# Patient Record
Sex: MALE | Race: WHITE | Employment: FULL TIME | URBAN - METROPOLITAN AREA
[De-identification: names, ages, dates, MRNs, and addresses within clinical notes are randomized per-mention and may not be internally consistent; named-entity substitution may affect disease eponyms.]

---

## 2021-12-27 ENCOUNTER — APPOINTMENT (OUTPATIENT)
Dept: GENERAL RADIOLOGY | Age: 51
DRG: 177 | End: 2021-12-27
Attending: EMERGENCY MEDICINE
Payer: COMMERCIAL

## 2021-12-27 ENCOUNTER — APPOINTMENT (OUTPATIENT)
Dept: CT IMAGING | Age: 51
DRG: 177 | End: 2021-12-27
Attending: EMERGENCY MEDICINE
Payer: COMMERCIAL

## 2021-12-27 ENCOUNTER — HOSPITAL ENCOUNTER (INPATIENT)
Age: 51
LOS: 3 days | Discharge: HOME | DRG: 177 | End: 2021-12-30
Attending: EMERGENCY MEDICINE | Admitting: INTERNAL MEDICINE
Payer: COMMERCIAL

## 2021-12-27 DIAGNOSIS — R09.02 HYPOXIA: ICD-10-CM

## 2021-12-27 DIAGNOSIS — U07.1 COVID-19: Primary | ICD-10-CM

## 2021-12-27 PROBLEM — I42.9 CARDIOMYOPATHY (HCC): Status: ACTIVE | Noted: 2021-12-27

## 2021-12-27 PROBLEM — J12.82 PNEUMONIA DUE TO COVID-19 VIRUS: Status: ACTIVE | Noted: 2021-12-27

## 2021-12-27 PROBLEM — Z72.0 TOBACCO ABUSE: Status: ACTIVE | Noted: 2021-12-27

## 2021-12-27 PROBLEM — I10 HTN (HYPERTENSION), BENIGN: Status: ACTIVE | Noted: 2021-12-27

## 2021-12-27 LAB
ALBUMIN SERPL-MCNC: 3.2 G/DL (ref 3.5–5)
ALBUMIN/GLOB SERPL: 0.8 {RATIO} (ref 1.1–2.2)
ALP SERPL-CCNC: 36 U/L (ref 45–117)
ALT SERPL-CCNC: 40 U/L (ref 12–78)
ANION GAP SERPL CALC-SCNC: 9 MMOL/L (ref 5–15)
AST SERPL-CCNC: 40 U/L (ref 15–37)
ATRIAL RATE: 117 BPM
BASOPHILS # BLD: 0 K/UL (ref 0–0.1)
BASOPHILS NFR BLD: 0 % (ref 0–1)
BILIRUB SERPL-MCNC: 0.2 MG/DL (ref 0.2–1)
BUN SERPL-MCNC: 13 MG/DL (ref 6–20)
BUN/CREAT SERPL: 13 (ref 12–20)
CALCIUM SERPL-MCNC: 8.8 MG/DL (ref 8.5–10.1)
CALCULATED P AXIS, ECG09: 40 DEGREES
CALCULATED R AXIS, ECG10: -38 DEGREES
CALCULATED T AXIS, ECG11: 23 DEGREES
CHLORIDE SERPL-SCNC: 99 MMOL/L (ref 97–108)
CO2 SERPL-SCNC: 29 MMOL/L (ref 21–32)
COVID-19 RAPID TEST, COVR: DETECTED
CREAT SERPL-MCNC: 0.99 MG/DL (ref 0.7–1.3)
CRP SERPL-MCNC: 1.92 MG/DL (ref 0–0.6)
D DIMER PPP FEU-MCNC: 1.12 MG/L FEU (ref 0–0.65)
DIAGNOSIS, 93000: NORMAL
DIFFERENTIAL METHOD BLD: ABNORMAL
EOSINOPHIL # BLD: 0 K/UL (ref 0–0.4)
EOSINOPHIL NFR BLD: 0 % (ref 0–7)
ERYTHROCYTE [DISTWIDTH] IN BLOOD BY AUTOMATED COUNT: 13 % (ref 11.5–14.5)
GLOBULIN SER CALC-MCNC: 3.8 G/DL (ref 2–4)
GLUCOSE SERPL-MCNC: 115 MG/DL (ref 65–100)
HCT VFR BLD AUTO: 52 % (ref 36.6–50.3)
HGB BLD-MCNC: 17.3 G/DL (ref 12.1–17)
IMM GRANULOCYTES # BLD AUTO: 0 K/UL
IMM GRANULOCYTES NFR BLD AUTO: 0 %
LYMPHOCYTES # BLD: 0.7 K/UL (ref 0.8–3.5)
LYMPHOCYTES NFR BLD: 15 % (ref 12–49)
MCH RBC QN AUTO: 29.8 PG (ref 26–34)
MCHC RBC AUTO-ENTMCNC: 33.3 G/DL (ref 30–36.5)
MCV RBC AUTO: 89.5 FL (ref 80–99)
MONOCYTES # BLD: 0.2 K/UL (ref 0–1)
MONOCYTES NFR BLD: 5 % (ref 5–13)
NEUTS BAND NFR BLD MANUAL: 8 % (ref 0–6)
NEUTS SEG # BLD: 3.3 K/UL (ref 1.8–8)
NEUTS SEG NFR BLD: 66 % (ref 32–75)
NRBC # BLD: 0 K/UL (ref 0–0.01)
NRBC BLD-RTO: 0 PER 100 WBC
OTHER CELLS NFR BLD MANUAL: 6 %
P-R INTERVAL, ECG05: 182 MS
PLATELET # BLD AUTO: 181 K/UL (ref 150–400)
PMV BLD AUTO: 10.9 FL (ref 8.9–12.9)
POTASSIUM SERPL-SCNC: 3.7 MMOL/L (ref 3.5–5.1)
PROCALCITONIN SERPL-MCNC: <0.05 NG/ML
PROT SERPL-MCNC: 7 G/DL (ref 6.4–8.2)
Q-T INTERVAL, ECG07: 326 MS
QRS DURATION, ECG06: 90 MS
QTC CALCULATION (BEZET), ECG08: 454 MS
RBC # BLD AUTO: 5.81 M/UL (ref 4.1–5.7)
RBC MORPH BLD: ABNORMAL
SODIUM SERPL-SCNC: 137 MMOL/L (ref 136–145)
SOURCE, COVRS: ABNORMAL
TROPONIN-HIGH SENSITIVITY: 11 NG/L (ref 0–76)
VENTRICULAR RATE, ECG03: 117 BPM
WBC # BLD AUTO: 4.5 K/UL (ref 4.1–11.1)

## 2021-12-27 PROCEDURE — 96374 THER/PROPH/DIAG INJ IV PUSH: CPT

## 2021-12-27 PROCEDURE — 94640 AIRWAY INHALATION TREATMENT: CPT

## 2021-12-27 PROCEDURE — 71045 X-RAY EXAM CHEST 1 VIEW: CPT

## 2021-12-27 PROCEDURE — 74011250636 HC RX REV CODE- 250/636: Performed by: EMERGENCY MEDICINE

## 2021-12-27 PROCEDURE — 74011000636 HC RX REV CODE- 636: Performed by: EMERGENCY MEDICINE

## 2021-12-27 PROCEDURE — 85379 FIBRIN DEGRADATION QUANT: CPT

## 2021-12-27 PROCEDURE — 85025 COMPLETE CBC W/AUTO DIFF WBC: CPT

## 2021-12-27 PROCEDURE — 71275 CT ANGIOGRAPHY CHEST: CPT

## 2021-12-27 PROCEDURE — 93005 ELECTROCARDIOGRAM TRACING: CPT

## 2021-12-27 PROCEDURE — 36415 COLL VENOUS BLD VENIPUNCTURE: CPT

## 2021-12-27 PROCEDURE — 84484 ASSAY OF TROPONIN QUANT: CPT

## 2021-12-27 PROCEDURE — 84145 PROCALCITONIN (PCT): CPT

## 2021-12-27 PROCEDURE — 86140 C-REACTIVE PROTEIN: CPT

## 2021-12-27 PROCEDURE — 96361 HYDRATE IV INFUSION ADD-ON: CPT

## 2021-12-27 PROCEDURE — 99285 EMERGENCY DEPT VISIT HI MDM: CPT

## 2021-12-27 PROCEDURE — 80053 COMPREHEN METABOLIC PANEL: CPT

## 2021-12-27 PROCEDURE — 65270000029 HC RM PRIVATE

## 2021-12-27 PROCEDURE — 74011250637 HC RX REV CODE- 250/637: Performed by: EMERGENCY MEDICINE

## 2021-12-27 PROCEDURE — 87635 SARS-COV-2 COVID-19 AMP PRB: CPT

## 2021-12-27 RX ORDER — DEXAMETHASONE 6 MG/1
6 TABLET ORAL DAILY
Status: DISCONTINUED | OUTPATIENT
Start: 2021-12-28 | End: 2021-12-30 | Stop reason: HOSPADM

## 2021-12-27 RX ORDER — ACETAMINOPHEN 500 MG
1000 TABLET ORAL
Status: COMPLETED | OUTPATIENT
Start: 2021-12-27 | End: 2021-12-27

## 2021-12-27 RX ORDER — DEXAMETHASONE SODIUM PHOSPHATE 100 MG/10ML
6 INJECTION INTRAMUSCULAR; INTRAVENOUS ONCE
Status: COMPLETED | OUTPATIENT
Start: 2021-12-27 | End: 2021-12-27

## 2021-12-27 RX ORDER — ALBUTEROL SULFATE 90 UG/1
4 AEROSOL, METERED RESPIRATORY (INHALATION)
Status: COMPLETED | OUTPATIENT
Start: 2021-12-27 | End: 2021-12-27

## 2021-12-27 RX ADMIN — DEXAMETHASONE SODIUM PHOSPHATE 6 MG: 10 INJECTION INTRAMUSCULAR; INTRAVENOUS at 16:06

## 2021-12-27 RX ADMIN — IOPAMIDOL 100 ML: 755 INJECTION, SOLUTION INTRAVENOUS at 16:04

## 2021-12-27 RX ADMIN — SODIUM CHLORIDE 1000 ML: 9 INJECTION, SOLUTION INTRAVENOUS at 14:21

## 2021-12-27 RX ADMIN — ALBUTEROL SULFATE 4 PUFF: 90 AEROSOL, METERED RESPIRATORY (INHALATION) at 14:49

## 2021-12-27 RX ADMIN — ALBUTEROL SULFATE 4 PUFF: 90 AEROSOL, METERED RESPIRATORY (INHALATION) at 14:30

## 2021-12-27 RX ADMIN — ALBUTEROL SULFATE 4 PUFF: 90 AEROSOL, METERED RESPIRATORY (INHALATION) at 15:25

## 2021-12-27 RX ADMIN — ACETAMINOPHEN 1000 MG: 500 TABLET ORAL at 14:20

## 2021-12-27 NOTE — ED PROVIDER NOTES
The history is provided by the patient. No  was used. Positive For Covid-19  Severity:  Severe  Onset quality:  Gradual  Timing:  Constant  Progression:  Worsening  Relieved by:  Nothing  Worsened by:  Nothing  Associated symptoms: congestion, cough, diarrhea, headaches, myalgias, sore throat and vomiting    Associated symptoms: no chest pain, no chills, no confusion, no dysuria, no ear pain, no nausea, no rash, no rhinorrhea and no somnolence         Past Medical History:   Diagnosis Date    Cardiomyopathy     Hypertension        Past Surgical History:   Procedure Laterality Date    HX HIP REPLACEMENT Left 2018         History reviewed. No pertinent family history. Social History     Socioeconomic History    Marital status:      Spouse name: Not on file    Number of children: Not on file    Years of education: Not on file    Highest education level: Not on file   Occupational History    Not on file   Tobacco Use    Smoking status: Current Every Day Smoker     Packs/day: 0.50    Smokeless tobacco: Current User   Substance and Sexual Activity    Alcohol use: Yes     Alcohol/week: 3.0 standard drinks     Types: 3 Cans of beer per week    Drug use: No    Sexual activity: Not on file   Other Topics Concern    Not on file   Social History Narrative    Not on file     Social Determinants of Health     Financial Resource Strain:     Difficulty of Paying Living Expenses: Not on file   Food Insecurity:     Worried About Running Out of Food in the Last Year: Not on file    [place] of Food in the Last Year: Not on file   Transportation Needs:     Lack of Transportation (Medical): Not on file    Lack of Transportation (Non-Medical):  Not on file   Physical Activity:     Days of Exercise per Week: Not on file    Minutes of Exercise per Session: Not on file   Stress:     Feeling of Stress : Not on file   Social Connections:     Frequency of Communication with Friends and Family: Not on file    Frequency of Social Gatherings with Friends and Family: Not on file    Attends [employer] Services: Not on file    Active Member of Clubs or Organizations: Not on file    Attends Club or Organization Meetings: Not on file    Marital Status: Not on file   Intimate Partner Violence:     Fear of Current or Ex-Partner: Not on file    Emotionally Abused: Not on file    Physically Abused: Not on file    Sexually Abused: Not on file   Housing Stability:     Unable to Pay for Housing in the Last Year: Not on file    Number of [place] in the Last Year: Not on file    Unstable Housing in the Last Year: Not on file         ALLERGIES: Benadryl [diphenhydramine hcl]    Review of Systems   Constitutional: Negative for activity change, chills and fever. HENT: Positive for congestion and sore throat. Negative for ear pain, nosebleeds, rhinorrhea, trouble swallowing and voice change. Eyes: Negative for visual disturbance. Respiratory: Positive for cough and shortness of breath. Cardiovascular: Negative for chest pain and palpitations. Gastrointestinal: Positive for diarrhea and vomiting. Negative for abdominal pain, constipation and nausea. Genitourinary: Negative for difficulty urinating, dysuria, hematuria and urgency. Musculoskeletal: Positive for myalgias. Negative for back pain, neck pain and neck stiffness. Skin: Negative for color change and rash. Allergic/Immunologic: Negative for immunocompromised state. Neurological: Positive for headaches. Negative for dizziness, seizures, syncope, weakness, light-headedness and numbness. Psychiatric/Behavioral: Negative for behavioral problems, confusion, hallucinations, self-injury and suicidal ideas.        Vitals:    12/27/21 1314   BP: (!) 125/107   Pulse: (!) 129   Resp: 22   Temp: (!) 101 °F (38.3 °C)   SpO2: 90%   Weight: 121.5 kg (267 lb 13.7 oz)   Height: 6' 1\" (1.854 m)            Physical Exam  Vitals and nursing note reviewed. Constitutional:       General: He is not in acute distress. Appearance: He is well-developed. He is ill-appearing. He is not diaphoretic. HENT:      Head: Normocephalic and atraumatic. Eyes:      Pupils: Pupils are equal, round, and reactive to light. Cardiovascular:      Rate and Rhythm: Regular rhythm. Tachycardia present. Heart sounds: Normal heart sounds. No murmur heard. No friction rub. No gallop. Pulmonary:      Effort: Pulmonary effort is normal. No respiratory distress. Breath sounds: Wheezing present. Abdominal:      General: Bowel sounds are normal. There is no distension. Palpations: Abdomen is soft. Tenderness: There is no abdominal tenderness. There is no guarding or rebound. Musculoskeletal:         General: Normal range of motion. Cervical back: Normal range of motion and neck supple. Skin:     General: Skin is warm. Findings: No rash. Neurological:      Mental Status: He is alert and oriented to person, place, and time. Psychiatric:         Behavior: Behavior normal.         Thought Content: Thought content normal.         Judgment: Judgment normal.          MDM     This is a 65-year-old male with past medical history, review of systems, physical exam as above, presenting with complaints of cough, shortness of breath, myalgias, sore throat, nausea and vomiting. Patient states symptoms began 8 days ago, and he did a home test which was positive for coronavirus. Patient states he is not vaccinated. He endorses a history of cardiomyopathy, hyperlipidemia, endorses daily tobacco use of approximately 1 pack/day and weekly alcohol use. Physical exam is remarkable for ill-appearing middle-aged male, in no acute distress noted to be normotensive, febrile, tachycardic, satting in the low 90s at rest.  He has mild scattered wheezes, and decreased breath sounds to auscultation.   Chest x-ray obtained initially concerning for viral pneumonia. Plan to provide antipyretics and albuterol, IV fluids, obtain CMP, CBC, EKG, cardiac enzymes, D-dimer. We will reassess, and make a disposition. Procedures    SIGN OUT:  3:00 PM  Discussed pt's hx, disposition, and available diagnostic and imaging results with Dr. Courtney Fernandez. Reviewed care plans. Both providers and patient are in agreement with care plan. Dr. Elpidio Posey is transferring care of the pt to Dr. Courtney Fernandez at this time.

## 2021-12-27 NOTE — ED TRIAGE NOTES
Patient presents ambulatory to treatment area with a steady gait. Patient states he was diagnosed with covid 8 days PTA. Since that time, patient has had fevers and worsening shortness of breath. Patient has also had mild diarrhea and loss of appetite. Ambulatory spo2 90% on room air. Patient states symptoms are worse at night.

## 2021-12-27 NOTE — ED NOTES
Perfect Serve Consult for Admission  4:46 PM    ED Room Number: DXP01/77  Patient Name and age:  Bello Walsh 45 y.o.  male  Working Diagnosis:   1. COVID-19    2. Hypoxia        COVID-19 Suspicion:  yes  Sepsis present:  no  Reassessment needed: no  Code Status:  Full Code  Readmission: no  Isolation Requirements:  yes  Recommended Level of Care:  telemetry  Department:[place] [facility] - [phone]  Other: Patient is a 67-year-old male with past medical history significant for tobacco and alcohol use as well as history of familial cardiomyopathy and hyperlipidemia who presents with COVID-19 infection and hypoxia, viral pna on imaging.

## 2021-12-28 PROBLEM — J96.01 ACUTE RESPIRATORY FAILURE WITH HYPOXIA (HCC): Status: ACTIVE | Noted: 2021-12-28

## 2021-12-28 LAB
COMMENT, HOLDF: NORMAL
PROCALCITONIN SERPL-MCNC: <0.05 NG/ML
SAMPLES BEING HELD,HOLD: NORMAL

## 2021-12-28 PROCEDURE — XW0DXM6 INTRODUCTION OF BARICITINIB INTO MOUTH AND PHARYNX, EXTERNAL APPROACH, NEW TECHNOLOGY GROUP 6: ICD-10-PCS | Performed by: INTERNAL MEDICINE

## 2021-12-28 PROCEDURE — 94664 DEMO&/EVAL PT USE INHALER: CPT

## 2021-12-28 PROCEDURE — 74011250636 HC RX REV CODE- 250/636: Performed by: INTERNAL MEDICINE

## 2021-12-28 PROCEDURE — 2709999900 HC NON-CHARGEABLE SUPPLY

## 2021-12-28 PROCEDURE — 74011250637 HC RX REV CODE- 250/637: Performed by: INTERNAL MEDICINE

## 2021-12-28 PROCEDURE — 94760 N-INVAS EAR/PLS OXIMETRY 1: CPT

## 2021-12-28 PROCEDURE — 36415 COLL VENOUS BLD VENIPUNCTURE: CPT

## 2021-12-28 PROCEDURE — 84145 PROCALCITONIN (PCT): CPT

## 2021-12-28 PROCEDURE — 94640 AIRWAY INHALATION TREATMENT: CPT

## 2021-12-28 PROCEDURE — 77030027138 HC INCENT SPIROMETER -A

## 2021-12-28 PROCEDURE — 65270000029 HC RM PRIVATE

## 2021-12-28 RX ORDER — HYDROCODONE BITARTRATE AND ACETAMINOPHEN 5; 325 MG/1; MG/1
1 TABLET ORAL
Status: DISCONTINUED | OUTPATIENT
Start: 2021-12-28 | End: 2021-12-30 | Stop reason: HOSPADM

## 2021-12-28 RX ORDER — ATORVASTATIN CALCIUM 20 MG/1
20 TABLET, FILM COATED ORAL DAILY
Status: DISCONTINUED | OUTPATIENT
Start: 2021-12-28 | End: 2021-12-30 | Stop reason: HOSPADM

## 2021-12-28 RX ORDER — PANTOPRAZOLE SODIUM 40 MG/1
40 TABLET, DELAYED RELEASE ORAL
Status: DISCONTINUED | OUTPATIENT
Start: 2021-12-28 | End: 2021-12-28

## 2021-12-28 RX ORDER — ALBUTEROL SULFATE 90 UG/1
2 AEROSOL, METERED RESPIRATORY (INHALATION)
Status: DISCONTINUED | OUTPATIENT
Start: 2021-12-28 | End: 2021-12-30 | Stop reason: HOSPADM

## 2021-12-28 RX ORDER — PANTOPRAZOLE SODIUM 40 MG/1
40 TABLET, DELAYED RELEASE ORAL
Status: DISCONTINUED | OUTPATIENT
Start: 2021-12-28 | End: 2021-12-30 | Stop reason: HOSPADM

## 2021-12-28 RX ORDER — ASCORBIC ACID 500 MG
500 TABLET ORAL 2 TIMES DAILY
Status: DISCONTINUED | OUTPATIENT
Start: 2021-12-28 | End: 2021-12-30 | Stop reason: HOSPADM

## 2021-12-28 RX ORDER — ONDANSETRON 2 MG/ML
4 INJECTION INTRAMUSCULAR; INTRAVENOUS
Status: DISCONTINUED | OUTPATIENT
Start: 2021-12-28 | End: 2021-12-30 | Stop reason: HOSPADM

## 2021-12-28 RX ORDER — BENZONATATE 100 MG/1
100 CAPSULE ORAL 3 TIMES DAILY
Status: DISCONTINUED | OUTPATIENT
Start: 2021-12-28 | End: 2021-12-30 | Stop reason: HOSPADM

## 2021-12-28 RX ORDER — LISINOPRIL 5 MG/1
5 TABLET ORAL DAILY
Status: DISCONTINUED | OUTPATIENT
Start: 2021-12-28 | End: 2021-12-30 | Stop reason: HOSPADM

## 2021-12-28 RX ORDER — HYDROCODONE POLISTIREX AND CHLORPHENIRAMINE POLISTIREX 10; 8 MG/5ML; MG/5ML
5 SUSPENSION, EXTENDED RELEASE ORAL
Status: DISCONTINUED | OUTPATIENT
Start: 2021-12-28 | End: 2021-12-30 | Stop reason: HOSPADM

## 2021-12-28 RX ORDER — ACETAMINOPHEN 325 MG/1
650 TABLET ORAL
Status: DISCONTINUED | OUTPATIENT
Start: 2021-12-28 | End: 2021-12-30 | Stop reason: HOSPADM

## 2021-12-28 RX ORDER — SPIRONOLACTONE 25 MG/1
25 TABLET ORAL DAILY
Status: DISCONTINUED | OUTPATIENT
Start: 2021-12-28 | End: 2021-12-30 | Stop reason: HOSPADM

## 2021-12-28 RX ORDER — ENOXAPARIN SODIUM 100 MG/ML
40 INJECTION SUBCUTANEOUS EVERY 24 HOURS
Status: DISCONTINUED | OUTPATIENT
Start: 2021-12-28 | End: 2021-12-30 | Stop reason: HOSPADM

## 2021-12-28 RX ORDER — IBUPROFEN 200 MG
1 TABLET ORAL DAILY
Status: DISCONTINUED | OUTPATIENT
Start: 2021-12-28 | End: 2021-12-30 | Stop reason: HOSPADM

## 2021-12-28 RX ORDER — ZINC SULFATE 50(220)MG
1 CAPSULE ORAL DAILY
Status: DISCONTINUED | OUTPATIENT
Start: 2021-12-28 | End: 2021-12-30 | Stop reason: HOSPADM

## 2021-12-28 RX ORDER — SODIUM CHLORIDE 9 MG/ML
75 INJECTION, SOLUTION INTRAVENOUS CONTINUOUS
Status: DISCONTINUED | OUTPATIENT
Start: 2021-12-28 | End: 2021-12-28

## 2021-12-28 RX ORDER — METOPROLOL SUCCINATE 50 MG/1
100 TABLET, EXTENDED RELEASE ORAL DAILY
Status: DISCONTINUED | OUTPATIENT
Start: 2021-12-28 | End: 2021-12-30 | Stop reason: HOSPADM

## 2021-12-28 RX ORDER — ASPIRIN 81 MG/1
81 TABLET ORAL DAILY
Status: DISCONTINUED | OUTPATIENT
Start: 2021-12-28 | End: 2021-12-30 | Stop reason: HOSPADM

## 2021-12-28 RX ADMIN — ATORVASTATIN CALCIUM 20 MG: 20 TABLET, FILM COATED ORAL at 08:41

## 2021-12-28 RX ADMIN — PANTOPRAZOLE SODIUM 40 MG: 40 TABLET, DELAYED RELEASE ORAL at 08:41

## 2021-12-28 RX ADMIN — BARICITINIB 4 MG: 2 TABLET, FILM COATED ORAL at 13:09

## 2021-12-28 RX ADMIN — LISINOPRIL 5 MG: 5 TABLET ORAL at 08:40

## 2021-12-28 RX ADMIN — OXYCODONE HYDROCHLORIDE AND ACETAMINOPHEN 500 MG: 500 TABLET ORAL at 08:41

## 2021-12-28 RX ADMIN — OXYCODONE HYDROCHLORIDE AND ACETAMINOPHEN 500 MG: 500 TABLET ORAL at 18:02

## 2021-12-28 RX ADMIN — ZINC SULFATE 220 MG (50 MG) CAPSULE 1 CAPSULE: CAPSULE at 08:40

## 2021-12-28 RX ADMIN — SPIRONOLACTONE 25 MG: 25 TABLET ORAL at 08:40

## 2021-12-28 RX ADMIN — METOPROLOL SUCCINATE 100 MG: 50 TABLET, EXTENDED RELEASE ORAL at 08:41

## 2021-12-28 RX ADMIN — ENOXAPARIN SODIUM 40 MG: 100 INJECTION SUBCUTANEOUS at 14:17

## 2021-12-28 RX ADMIN — IPRATROPIUM BROMIDE AND ALBUTEROL 1 PUFF: 20; 100 SPRAY, METERED RESPIRATORY (INHALATION) at 20:02

## 2021-12-28 RX ADMIN — BENZONATATE 100 MG: 100 CAPSULE ORAL at 14:17

## 2021-12-28 RX ADMIN — ASPIRIN 81 MG: 81 TABLET, COATED ORAL at 08:40

## 2021-12-28 RX ADMIN — PANTOPRAZOLE SODIUM 40 MG: 40 TABLET, DELAYED RELEASE ORAL at 18:02

## 2021-12-28 RX ADMIN — BENZONATATE 100 MG: 100 CAPSULE ORAL at 22:53

## 2021-12-28 RX ADMIN — SODIUM CHLORIDE 75 ML/HR: 9 INJECTION, SOLUTION INTRAVENOUS at 05:03

## 2021-12-28 RX ADMIN — DEXAMETHASONE 6 MG: 6 TABLET ORAL at 08:40

## 2021-12-28 RX ADMIN — ACETAMINOPHEN 650 MG: 325 TABLET ORAL at 18:02

## 2021-12-28 NOTE — PROGRESS NOTES
Baricitinib Initiation Note    This patient meets criteria for initiation of baricitinib based on the following:  Confirmed COVID-19 (+) and hospitalized  Requiring oxygen support - specific O2 saturation is not a determinant for baricitinib  Elevated inflammatory markers (CRP, D-dimer, LDH, or ferritin). Concomitant therapy with dexamethasone 6-20 mg IV/PO daily (or equivalent steroid)  Do not give if patient has already received tocilizumab for COVID-19 treatment due to long half-life of tocilizumab (16 days)     Prescribers should weigh risks/benefits before initiating therapy in patients with the following:   Pregnancy  Severe hepatic impairment  Chronic/recurrent infections  Hemoglobin <8.0 g/dL   History/current thrombosis    Plan:  - Baricitinib 4 mg po daily X 14 days or until hospital discharge, whichever is sooner, has been ordered. Dose has been adjusted for renal function. Confirmed with RN over the phone patient is currently receiving 4 L NC O2. CRP is elevated at baseline but <7.5 mg/dL  - Dexamethasone 6 mg PO daily is ordered  - Renal function will be monitored daily while on baricitinib. -VTE prophylaxis is recommended unless contraindicated.  Will ask hospitalist if we can start lovenox 40 mg Q12 hrs

## 2021-12-28 NOTE — ACP (ADVANCE CARE PLANNING)
Advance Care Planning   Healthcare Decision Maker:       Primary Decision Maker: Julissa Carballo - Spouse - [phone]    Click here to complete [address] including selection of the Healthcare Decision Maker Relationship (ie \"Primary\")  Today we documented Decision Maker(s) consistent with Legal Next of Kin hierarchy. Updated contact and phone number for pt's wife.

## 2021-12-28 NOTE — PROGRESS NOTES
Srikanth Marquez [facility]  [address]  [phone]      Medical Progress Note      NAME: Nick Coto   :  1970  MRM:  [id number]    Date/Time: 2021  2:53 PM         Subjective:     Chief Complaint:  \"I'm okay\"     Pt seen and examined. Wife called to update while in the room. Pt feels slightly less SOB today. Poor PO intake but tolerating liquids     ROS:  (bold if positive, if negative)             Objective:       Vitals:          Last 24hrs VS reviewed since prior progress note. Most recent are:    Visit Vitals  /88 (BP 1 Location: Right upper arm, BP Patient Position: At rest)   Pulse 76   Temp 98.3 °F (36.8 °C)   Resp 22   Ht 6' 1\" (1.854 m)   Wt 121.5 kg (267 lb 13.7 oz)   SpO2 93%   BMI 35.34 kg/m²     SpO2 Readings from Last 6 Encounters:   21 93%   11 98%    O2 Flow Rate (L/min): 4 l/min       Intake/Output Summary (Last 24 hours) at 2021 1453  Last data filed at 2021 0830  Gross per 24 hour   Intake 1000 ml   Output 275 ml   Net 725 ml          Exam:     Physical Exam:    Gen:  Well-developed, well-nourished, in no acute distress  HEENT:  Pink conjunctivae, PERRL, hearing intact to voice, moist mucous membranes  Neck:  Supple, without masses, thyroid non-tender  Resp: Scattered crackles.  Poor air movement   Card:  No murmurs, normal S1, S2 without thrills, bruits or peripheral edema  Abd:  Soft, non-tender, non-distended, normoactive bowel sounds are present  Musc:  No cyanosis or clubbing  Skin:  No rashes or ulcers, skin turgor is good  Neuro:  Cranial nerves 3-12 are grossly intact,  strength is 5/5 bilaterally and dorsi / plantarflexion is 5/5 bilaterally, follows commands appropriately  Psych:  Good insight, oriented to person, place and time, alert    Medications Reviewed: (see below)    Lab Data Reviewed: (see below)    ______________________________________________________________________    Medications:     Current Facility-Administered Medications   Medication Dose Route Frequency    aspirin delayed-release tablet 81 mg  81 mg Oral DAILY    lisinopriL (PRINIVIL, ZESTRIL) tablet 5 mg  5 mg Oral DAILY    metoprolol succinate (TOPROL-XL) XL tablet 100 mg  100 mg Oral DAILY    atorvastatin (LIPITOR) tablet 20 mg  20 mg Oral DAILY    spironolactone (ALDACTONE) tablet 25 mg  25 mg Oral DAILY    nicotine (NICODERM CQ) 21 mg/24 hr patch 1 Patch  1 Patch TransDERmal DAILY    ascorbic acid (vitamin C) (VITAMIN C) tablet 500 mg  500 mg Oral BID    zinc sulfate (ZINCATE) 50 mg zinc (220 mg) capsule 1 Capsule  1 Capsule Oral DAILY    baricitinib (OLUMIANT) tablet 4 mg  4 mg Oral DAILY    benzonatate (TESSALON) capsule 100 mg  100 mg Oral TID    pantoprazole (PROTONIX) tablet 40 mg  40 mg Oral ACB&D    aluminum-magnesium hydroxide (MAALOX) oral suspension 15 mL  15 mL Oral QID PRN    aluminum-magnesium hydroxide (MAALOX) oral suspension 15 mL  15 mL Oral ONCE    HYDROcodone-chlorpheniramine (TUSSIONEX) oral suspension 5 mL  5 mL Oral Q12H PRN    acetaminophen (TYLENOL) tablet 650 mg  650 mg Oral Q6H PRN    HYDROcodone-acetaminophen (NORCO) 5-325 mg per tablet 1 Tablet  1 Tablet Oral Q6H PRN    ondansetron (ZOFRAN) injection 4 mg  4 mg IntraVENous Q6H PRN    enoxaparin (LOVENOX) injection 40 mg  40 mg SubCUTAneous Q24H    dexAMETHasone (DECADRON) tablet 6 mg  6 mg Oral DAILY            Lab Review:     Recent Labs     12/27/21  1338   WBC 4.5   HGB 17.3*   HCT 52.0*        Recent Labs     12/27/21  1338      K 3.7   CL 99   CO2 29   *   BUN 13   CREA 0.99   CA 8.8   ALB 3.2*   ALT 40     No components found for: [place]         Assessment / Plan:   Acute respiratory failure with hypoxia.  At risk for decompensation due to vaccination status, co-morbidities and weight   -start baricitinib  -procalcitonin low   -d-dimer low and CTA negative => DVT prophylaxis started   -vitamin C, zinc   -start Combivent; PRN albuterol   -continue steroids   -incentive spirometry, OOB, prone => pt aware   -trend      Pneumonia due to COVID-19 virus (12/27/2021).   Diagnosed about 8-9 days ago  -CRP is elevated; start baricitinib     HTN (hypertension), benign (12/27/2021)/ Cardiomyopathy ([address] (12/27/2021)  -on metoprolol, lisinopril     Tobacco abuse (12/27/2021)  -nicotine patch      Additional time spent from 1PM to 135 PM. Wife called and updated     Total time spent with patient: 28 [address] discussed with: Patient and Family    Discussed:  Care Plan    Prophylaxis:  Lovenox    Disposition:  Home w/Family           ___________________________________________________    Attending Physician: Ave Stearns MD

## 2021-12-28 NOTE — PROGRESS NOTES
Problem: Risk for Spread of Infection  Goal: Prevent transmission of infectious organism to others  Description: Prevent the transmission of infectious organisms to other patients, staff members, and visitors. Outcome: Progressing Towards Goal     Problem: Patient Education:  Go to Education Activity  Goal: Patient/Family Education  Outcome: Progressing Towards Goal     Problem: Falls - Risk of  Goal: *Absence of Falls  Description: Document Lidia Pedro Fall Risk and appropriate interventions in the flowsheet. Outcome: Progressing Towards Goal  Note: Fall Risk Interventions:            Medication Interventions: Teach patient to arise slowly                   Problem: Patient Education: Go to Patient Education Activity  Goal: Patient/Family Education  Outcome: Progressing Towards Goal     Problem: Airway Clearance - Ineffective  Goal: Achieve or maintain patent airway  Outcome: Progressing Towards Goal     Problem: Gas Exchange - Impaired  Goal: Absence of hypoxia  Outcome: Progressing Towards Goal  Goal: Promote optimal lung function  Outcome: Progressing Towards Goal     Problem: Breathing Pattern - Ineffective  Goal: Ability to achieve and maintain a regular respiratory rate  Outcome: Progressing Towards Goal     Problem:  Body Temperature -  Risk of, Imbalanced  Goal: Ability to maintain a body temperature within defined limits  Outcome: Progressing Towards Goal  Goal: Will regain or maintain usual level of consciousness  Outcome: Progressing Towards Goal  Goal: Complications related to the disease process, condition or treatment will be avoided or minimized  Outcome: Progressing Towards Goal     Problem: Isolation Precautions - Risk of Spread of Infection  Goal: Prevent transmission of infectious organism to others  Outcome: Progressing Towards Goal     Problem: Nutrition Deficits  Goal: Optimize nutrtional status  Outcome: Progressing Towards Goal     Problem: Risk for Fluid Volume Deficit  Goal: Maintain normal heart rhythm  Outcome: Progressing Towards Goal  Goal: Maintain absence of muscle cramping  Outcome: Progressing Towards Goal  Goal: Maintain normal serum potassium, sodium, calcium, phosphorus, and pH  Outcome: Progressing Towards Goal     Problem: Loneliness or Risk for Loneliness  Goal: Demonstrate positive use of time alone when socialization is not possible  Outcome: Progressing Towards Goal     Problem: Fatigue  Goal: Verbalize increase energy and improved vitality  Outcome: Progressing Towards Goal     Problem: Patient Education: Go to Patient Education Activity  Goal: Patient/Family Education  Outcome: Progressing Towards Goal

## 2021-12-28 NOTE — PROGRESS NOTES
12/28/2021 8:54 AM   Care Management Assessment      Reason for Admission:  Emergency -       ICD-10-CM ICD-9-CM    1. COVID-19  U07.1 079.89    2. Hypoxia  R09.02 799.02        Assessment:    [x]Via p/c with pt   Charted address and phone numbers confirmed. RUR: 5%  Risk Level: [x]Low []Moderate []High  Value-based purchasing: [] Yes [x] No  Bundle patient: [] Yes [x] No   Specify:     Advance Directive: No Order. [x] No AD on file. LNOK updated in chart. [] AD on file. [] Current AD not on file. Copy requested. [] Requests AD, and referral submitted to [facility]. Healthcare Decision Maker:   Primary Decision Maker: Greta Perez - Spouse - [phone]    Assessment:    Age: 45    Sex: [x] Male []Female     Residency: [x]Private residence     Lives With: [x]With spouse, Davian Etienne    Prior functioning:  [x]Independent with ADLs and iADLS []Dependent with ADLs and iADLs []Partial dependence, Specify:     Prior DME required:  [x]None []RW []Cane []Crutches []Bedside commode []CPAP []Home O2 (Liter/Provider: ) []Nebulizer   []Shower Chair []Wheelchair []Hospital Bed []Roel []Stair lift []Rollator []Other:    DME available: [x]None []RW []Cane []Crutches []Bedside commode []CPAP []Home O2 (Liter/Provider: ) []Nebulizer   []Shower Chair []Wheelchair []Hospital Bed []Roel []Stair lift []Rollator []Other:    Rehab history: []None [x]Outpatient PT []Home Health (Provider/Date: ) []SNF (Provider/Date: ) []IPR (Provider/Date: ) []LTC (Provider/Date: ) []Hospice (Provider/Date: )  []Other:     Discharge Concerns: []Yes [x]No []Unknown   Describe:    Comments:      Insurer:   Insurance Information                [employer] [address] Phone: --    Subscriber: Christina Giles Subscriber#: [id number]    Group#: [id number] Precert#: --          PCP: Shaista Zamora   Address: [facility] [address] [facility] [address] [place] [address]   Phone number: [phone]   Current patient: [x]Yes []No   Approximate date of last visit: over a year   Access to virtual PCP visits: [x]Yes []No    Pharmacy:  [facility], pt he will confirm this with his wife prior to discharge    DC Transport: Pt's wife       Transition of care plan:  [x] Home with outpatient follow-up  CM will follow for needs. ALEX Hunt  Care Management Interventions  PCP Verified by CM:  Yes Jaylynrenee Herr )  MyChart Signup: No  Discharge Durable Medical Equipment: No  Physical Therapy Consult: No  Occupational Therapy Consult: No  Speech Therapy Consult: No  Support Systems: Spouse/Significant Other  Discharge Location  Discharge Placement: Home with family assistance

## 2021-12-28 NOTE — ROUTINE PROCESS
2040 - TRANSFER - IN REPORT:    Verbal report received from Wilma Wheeler (name) on Gabriel Valderrama  being received from [facility] ED(unit) for routine progression of care      Report consisted of patients Situation, Background, Assessment and   Recommendations(SBAR). Information from the following report(s) SBAR, Intake/Output, MAR, Recent Results and Dual Neuro Assessment was reviewed with the receiving nurse. Opportunity for questions and clarification was provided. Assessment completed upon patients arrival to unit and care assumed. 3473 - Patient arrived via [facility].    0715 - Bedside and Verbal shift change report given to [employer] (oncoming nurse) by MICH Whitfield RN (offgoing nurse). Report included the following information SBAR, Intake/Output, MAR, Recent Results and Dual Neuro Assessment.

## 2021-12-28 NOTE — H&P
[address]  [facility]  [phone]    Admission History and Physical      NAME:  Humaira Lerma   :   1970   MRN:  [id number]     PCP:  Teresa Kasper MD     Date of service:  2021         Subjective:     CHIEF COMPLAINT: Cough, shortness of breath, fever    HISTORY OF PRESENT ILLNESS:     Mr. Alen Akers is a 45 y.o.  male who is admitted with acute respiratory failure with hypoxia. Mr. Alen Akers with past medical history of HTN, CM presented to ER complaining of cough, shortness of breath, fever which started about few days ago. He tested positive for Covid 19 about 8 days ago and his symptoms got progressively worse. The last 2 days shortness of breath cough and fever got worse. The cough is nonproductive. Patient is unvaccinated against Covid 19. Past Medical History:   Diagnosis Date    Cardiomyopathy     Hypertension         Past Surgical History:   Procedure Laterality Date    HX HIP REPLACEMENT Left        Social History     Tobacco Use    Smoking status: Current Every Day Smoker     Packs/day: 0.50    Smokeless tobacco: Current User   Substance Use Topics    Alcohol use: Yes     Alcohol/week: 3.0 standard drinks     Types: 3 Cans of beer per week        History reviewed. No pertinent family history. Allergies   Allergen Reactions    Benadryl [Diphenhydramine Hcl] Itching        Prior to Admission medications    Medication Sig Start Date End Date Taking? Authorizing Provider   simvastatin (ZOCOR) 40 mg tablet Take 40 mg by mouth nightly. Yes Other, MD Angelo   lisinopril (PRINIVIL, ZESTRIL) 5 mg tablet Take 5 mg by mouth daily. Yes Other, MD Angelo   spironolactone (ALDACTONE) 25 mg tablet Take 25 mg by mouth daily. Yes Other, MD Angelo   METOPROLOL SUCCINATE PO Take 100 mg by mouth daily.    Yes Alma, MD Angelo   lansoprazole (PREVACID) 30 mg capsule Take 30 mg by mouth Daily (before breakfast). Yes Other, MD Angelo   aspirin 81 mg tablet Take 81 mg by mouth. Yes Other, MD Angelo   chlorpheniramine-HYDROcodone (TUSSIONEX PENNKINETIC ER) 8-10 mg/5 mL suspension Take 5 mL by mouth three (3) times daily. Patient not taking: Reported on 12/27/2021 12/3/11   Briseyda Hearn MD   albuterol (PROVENTIL HFA, VENTOLIN HFA) 90 mcg/Actuation inhaler Take 2 Puffs by inhalation every four (4) hours as needed for Wheezing and Shortness of Breath (cough).   Patient not taking: Reported on 12/27/2021 12/3/11   Briseyda Hearn MD         Review of Systems:  (bold if positive, if negative)    Gen:  , fever,Eyes:  ENT:  CVS:  Pulm:  Cough, dyspneaGI:  :  MS:  Skin:  Psych:  Endo:  Hem:  Renal:  Neuro:            Objective:      VITALS:    Vital signs reviewed; most recent are:    Visit Vitals  /72   Pulse 81   Temp 98.4 °F (36.9 °C)   Resp 21   Ht 6' 1\" (1.854 m)   Wt 121.5 kg (267 lb 13.7 oz)   SpO2 92%   BMI 35.34 kg/m²     SpO2 Readings from Last 6 Encounters:   12/28/21 92%   12/03/11 98%            Intake/Output Summary (Last 24 hours) at 12/28/2021 0414  Last data filed at 12/27/2021 1527  Gross per 24 hour   Intake 1000 ml   Output    Net 1000 ml            Exam:     Physical Exam:    Gen:  Well-developed, well-nourished, in no acute distress  HEENT:  Pink conjunctivae, PERRL, hearing intact to voice, moist mucous membranes  Neck:  Supple, without masses, thyroid non-tender  Resp:  No accessory muscle use, clear breath sounds without wheezes rales or rhonchi  Card:  No murmurs, normal S1, S2 without thrills, bruits or peripheral edema  Abd:  Soft, non-tender, non-distended, normoactive bowel sounds are present, no palpable organomegaly and no detectable hernias  Lymph:  No cervical or inguinal adenopathy  Musc:  No cyanosis or clubbing  Skin:  No rashes or ulcers, skin turgor is good  Neuro:  Cranial nerves are grossly intact, no focal motor weakness, follows commands appropriately  Psych: Good insight, oriented to person, place and time, alert       Labs:    Recent Labs     12/27/21  1338   WBC 4.5   HGB 17.3*   HCT 52.0*        Recent Labs     12/27/21  1338      K 3.7   CL 99   CO2 29   *   BUN 13   CREA 0.99   CA 8.8   ALB 3.2*   TBILI 0.2   ALT 40     Lab Results   Component Value Date/Time    Glucose (POC) 106 12/03/2011 09:28 PM    Glucose (POC) 108 07/28/2009 11:20 PM     No results for input(s): PH, PCO2, PO2, HCO3, FIO2 in the last 72 hours. No results for input(s): INR, INREXT in the last 72 hours. Telemetry reviewed:   normal sinus rhythm       Assessment/Plan:    1. Acute respiratory failure with hypoxia, mild. Supplemental oxygen to keep SaO2 greater than 90%, incentive spirometer    2. Pneumonia due to COVID-19 virus (12/27/2021). Diagnosed about 8 days ago. The CRP is not elevated and does not require baricitinib. Starts dexamethasone, ascorbic acid, zinc.  Monitor inflammatory markers    3. HTN (hypertension), benign (12/27/2021)/ Cardiomyopathy ([address] (12/27/2021). Continue home metoprolol, lisinopril, simvastatin    4. Tobacco abuse (12/27/2021). Advised to quit.   Patient requesting nicotine patch       Previous medical records reviewed     Risk of deterioration: high      Total time spent with patient: [address] discussed with: Patient, Nursing Staff and >50% of time spent in counseling and coordination of care    Discussed:  Care Plan    Prophylaxis:  Lovenox    Probable Disposition:  Home w/Family           ___________________________________________________    Attending Physician: Hodan Fernandez MD

## 2021-12-29 LAB
ALBUMIN SERPL-MCNC: 2.9 G/DL (ref 3.5–5)
ALBUMIN/GLOB SERPL: 0.7 {RATIO} (ref 1.1–2.2)
ALP SERPL-CCNC: 35 U/L (ref 45–117)
ALT SERPL-CCNC: 36 U/L (ref 12–78)
ANION GAP SERPL CALC-SCNC: 7 MMOL/L (ref 5–15)
AST SERPL-CCNC: 32 U/L (ref 15–37)
BILIRUB SERPL-MCNC: 0.3 MG/DL (ref 0.2–1)
BUN SERPL-MCNC: 18 MG/DL (ref 6–20)
BUN/CREAT SERPL: 19 (ref 12–20)
CALCIUM SERPL-MCNC: 9.6 MG/DL (ref 8.5–10.1)
CHLORIDE SERPL-SCNC: 101 MMOL/L (ref 97–108)
CO2 SERPL-SCNC: 30 MMOL/L (ref 21–32)
COMMENT, HOLDF: NORMAL
CREAT SERPL-MCNC: 0.94 MG/DL (ref 0.7–1.3)
CRP SERPL-MCNC: 0.68 MG/DL (ref 0–0.6)
D DIMER PPP FEU-MCNC: 0.64 MG/L FEU (ref 0–0.65)
FERRITIN SERPL-MCNC: 485 NG/ML (ref 26–388)
GLOBULIN SER CALC-MCNC: 4 G/DL (ref 2–4)
GLUCOSE SERPL-MCNC: 126 MG/DL (ref 65–100)
LDH SERPL L TO P-CCNC: 278 U/L (ref 85–241)
POTASSIUM SERPL-SCNC: 4.7 MMOL/L (ref 3.5–5.1)
PROT SERPL-MCNC: 6.9 G/DL (ref 6.4–8.2)
SAMPLES BEING HELD,HOLD: NORMAL
SODIUM SERPL-SCNC: 138 MMOL/L (ref 136–145)

## 2021-12-29 PROCEDURE — 94664 DEMO&/EVAL PT USE INHALER: CPT

## 2021-12-29 PROCEDURE — 94640 AIRWAY INHALATION TREATMENT: CPT

## 2021-12-29 PROCEDURE — 94761 N-INVAS EAR/PLS OXIMETRY MLT: CPT

## 2021-12-29 PROCEDURE — 74011250636 HC RX REV CODE- 250/636: Performed by: INTERNAL MEDICINE

## 2021-12-29 PROCEDURE — 65270000029 HC RM PRIVATE

## 2021-12-29 PROCEDURE — 74011250637 HC RX REV CODE- 250/637: Performed by: INTERNAL MEDICINE

## 2021-12-29 PROCEDURE — 83615 LACTATE (LD) (LDH) ENZYME: CPT

## 2021-12-29 PROCEDURE — 77010033678 HC OXYGEN DAILY

## 2021-12-29 PROCEDURE — 86140 C-REACTIVE PROTEIN: CPT

## 2021-12-29 PROCEDURE — 36415 COLL VENOUS BLD VENIPUNCTURE: CPT

## 2021-12-29 PROCEDURE — 85379 FIBRIN DEGRADATION QUANT: CPT

## 2021-12-29 PROCEDURE — 80053 COMPREHEN METABOLIC PANEL: CPT

## 2021-12-29 PROCEDURE — 94760 N-INVAS EAR/PLS OXIMETRY 1: CPT

## 2021-12-29 PROCEDURE — 82728 ASSAY OF FERRITIN: CPT

## 2021-12-29 RX ADMIN — ATORVASTATIN CALCIUM 20 MG: 20 TABLET, FILM COATED ORAL at 11:39

## 2021-12-29 RX ADMIN — ZINC SULFATE 220 MG (50 MG) CAPSULE 1 CAPSULE: CAPSULE at 11:39

## 2021-12-29 RX ADMIN — IPRATROPIUM BROMIDE AND ALBUTEROL 1 PUFF: 20; 100 SPRAY, METERED RESPIRATORY (INHALATION) at 06:46

## 2021-12-29 RX ADMIN — BENZONATATE 100 MG: 100 CAPSULE ORAL at 20:35

## 2021-12-29 RX ADMIN — PANTOPRAZOLE SODIUM 40 MG: 40 TABLET, DELAYED RELEASE ORAL at 06:41

## 2021-12-29 RX ADMIN — BARICITINIB 4 MG: 2 TABLET, FILM COATED ORAL at 11:39

## 2021-12-29 RX ADMIN — SPIRONOLACTONE 25 MG: 25 TABLET ORAL at 11:39

## 2021-12-29 RX ADMIN — ACETAMINOPHEN 650 MG: 325 TABLET ORAL at 20:33

## 2021-12-29 RX ADMIN — PANTOPRAZOLE SODIUM 40 MG: 40 TABLET, DELAYED RELEASE ORAL at 17:24

## 2021-12-29 RX ADMIN — METOPROLOL SUCCINATE 100 MG: 50 TABLET, EXTENDED RELEASE ORAL at 11:39

## 2021-12-29 RX ADMIN — OXYCODONE HYDROCHLORIDE AND ACETAMINOPHEN 500 MG: 500 TABLET ORAL at 11:39

## 2021-12-29 RX ADMIN — DEXAMETHASONE 6 MG: 6 TABLET ORAL at 11:39

## 2021-12-29 RX ADMIN — IPRATROPIUM BROMIDE AND ALBUTEROL 1 PUFF: 20; 100 SPRAY, METERED RESPIRATORY (INHALATION) at 19:41

## 2021-12-29 RX ADMIN — BENZONATATE 100 MG: 100 CAPSULE ORAL at 11:39

## 2021-12-29 RX ADMIN — OXYCODONE HYDROCHLORIDE AND ACETAMINOPHEN 500 MG: 500 TABLET ORAL at 17:24

## 2021-12-29 RX ADMIN — IPRATROPIUM BROMIDE AND ALBUTEROL 1 PUFF: 20; 100 SPRAY, METERED RESPIRATORY (INHALATION) at 01:58

## 2021-12-29 RX ADMIN — IPRATROPIUM BROMIDE AND ALBUTEROL 1 PUFF: 20; 100 SPRAY, METERED RESPIRATORY (INHALATION) at 13:02

## 2021-12-29 RX ADMIN — ENOXAPARIN SODIUM 40 MG: 100 INJECTION SUBCUTANEOUS at 17:24

## 2021-12-29 RX ADMIN — ASPIRIN 81 MG: 81 TABLET, COATED ORAL at 11:39

## 2021-12-29 RX ADMIN — BENZONATATE 100 MG: 100 CAPSULE ORAL at 17:24

## 2021-12-29 NOTE — PROGRESS NOTES
12/29/2021  11:30 AM  Pt discussed w/ Dr Marzette Rubinstein, is continuing to require medical management for Acute Respiratory Failure w/ hypoxia, COVID 19 PNA, HTN,   Transitions of Care Plan:  RUR 5 %  LOS 2 days  1. Medical management continues  2. Acute Hypoxic Respiratory failure, COVID 19 PNA, pt on 3L O2, follow for home O2 DC  Needs  3. CM to follow through for treatment/response  4. DC when stable to home w/ family assistance, pt lives w/ spouse, independent w/ ADLs,   5. Outpatient f/u PCP  6. Family to transport at DC  7.  CM will follow and assist w/ DC needs  ROHIT Moses

## 2021-12-29 NOTE — PROGRESS NOTES
Srikanth Marquez [facility] [address]  [address] [facility], [place], [address] [place] [address]  [phone]      Medical Progress Note      NAME: Audrey Myers   :  1970  MRM:  [id number]    Date/Time: 2021  2:53 PM         Subjective:     Chief Complaint:  \"I feel a little better\"     Pt seen and examined. O2 weaned down. Tolerating more PO and less SOB     ROS:  (bold if positive, if negative)      SOB        Objective:       Vitals:          Last 24hrs VS reviewed since prior progress note. Most recent are:    Visit Vitals  /66 (BP 1 Location: Left upper arm, BP Patient Position: At rest)   Pulse 75   Temp 98.4 °F (36.9 °C)   Resp 18   Ht 6' 1\" (1.854 m)   Wt 121.5 kg (267 lb 13.7 oz)   SpO2 91%   BMI 35.34 kg/m²     SpO2 Readings from Last 6 Encounters:   21 91%   11 98%    O2 Flow Rate (L/min): 3 l/min     No intake or output data in the 24 hours ending 21 9301       Exam:     Physical Exam:    Gen:  Well-developed, well-nourished, in no acute distress  HEENT:  Pink conjunctivae, PERRL, hearing intact to voice, moist mucous membranes  Neck:  Supple, without masses, thyroid non-tender  Resp: Scattered crackles.  Improving air movement   Card:  No murmurs, normal S1, S2 without thrills, bruits or peripheral edema  Abd:  Soft, non-tender, non-distended, normoactive bowel sounds are present  Musc:  No cyanosis or clubbing  Skin:  No rashes or ulcers, skin turgor is good  Neuro:  Cranial nerves 3-12 are grossly intact,  strength is 5/5 bilaterally and dorsi / plantarflexion is 5/5 bilaterally, follows commands appropriately  Psych:  Good insight, oriented to person, place and time, alert    Medications Reviewed: (see below)    Lab Data Reviewed: (see below)    ______________________________________________________________________    Medications:     Current Facility-Administered Medications   Medication Dose Route Frequency    aspirin delayed-release tablet 81 mg  81 mg Oral DAILY    [Held by provider] lisinopriL (PRINIVIL, ZESTRIL) tablet 5 mg  5 mg Oral DAILY    metoprolol succinate (TOPROL-XL) XL tablet 100 mg  100 mg Oral DAILY    atorvastatin (LIPITOR) tablet 20 mg  20 mg Oral DAILY    spironolactone (ALDACTONE) tablet 25 mg  25 mg Oral DAILY    nicotine (NICODERM CQ) 21 mg/24 hr patch 1 Patch  1 Patch TransDERmal DAILY    ascorbic acid (vitamin C) (VITAMIN C) tablet 500 mg  500 mg Oral BID    zinc sulfate (ZINCATE) 50 mg zinc (220 mg) capsule 1 Capsule  1 Capsule Oral DAILY    baricitinib (OLUMIANT) tablet 4 mg  4 mg Oral DAILY    benzonatate (TESSALON) capsule 100 mg  100 mg Oral TID    pantoprazole (PROTONIX) tablet 40 mg  40 mg Oral ACB&D    aluminum-magnesium hydroxide (MAALOX) oral suspension 15 mL  15 mL Oral QID PRN    HYDROcodone-chlorpheniramine (TUSSIONEX) oral suspension 5 mL  5 mL Oral Q12H PRN    acetaminophen (TYLENOL) tablet 650 mg  650 mg Oral Q6H PRN    HYDROcodone-acetaminophen (NORCO) 5-325 mg per tablet 1 Tablet  1 Tablet Oral Q6H PRN    ondansetron (ZOFRAN) injection 4 mg  4 mg IntraVENous Q6H PRN    enoxaparin (LOVENOX) injection 40 mg  40 mg SubCUTAneous Q24H    ipratropium-albuterol (COMBIVENT RESPIMAT) 20 mcg-100 mcg inhalation spray  1 Puff Inhalation Q6H RT    albuterol (PROVENTIL HFA, VENTOLIN HFA, PROAIR HFA) inhaler 2 Puff  2 Puff Inhalation Q2H PRN    dexAMETHasone (DECADRON) tablet 6 mg  6 mg Oral DAILY            Lab Review:     Recent Labs     12/27/21  1338   WBC 4.5   HGB 17.3*   HCT 52.0*        Recent Labs     12/29/21  0419 12/27/21  1338    137   K 4.7 3.7    99   CO2 30 29   * 115*   BUN 18 13   CREA 0.94 0.99   CA 9.6 8.8   ALB 2.9* 3.2*   ALT 36 40     No components found for: [place]         Assessment / Plan:   Acute respiratory failure with hypoxia.  At risk for decompensation due to vaccination status, co-morbidities and weight   -continue baricitinib  -procalcitonin low   -d-dimer low and CTA negative => DVT prophylaxis started   -vitamin C, zinc   -continue Combivent; PRN albuterol   -continue steroids   -incentive spirometry, OOB, prone => pt aware   -trend inflammatory markers   -wean O2   -will need eval for hypoxia prior to d/c      Pneumonia due to COVID-19 virus (12/27/2021).   Diagnosed about 8-9 days ago  -as above      HTN (hypertension), benign (12/27/2021)/ Cardiomyopathy ([address] (12/27/2021)  -on metoprolol   -lisinopril held as K slightly \"high normal\"     Tobacco abuse (12/27/2021)  -nicotine patch     Total time spent with patient: [address] discussed with: Patient and Family    Discussed:  Care Plan    Prophylaxis:  Lovenox    Disposition:  Home w/Family           ___________________________________________________    Attending Physician: Keyona Teran MD

## 2021-12-29 NOTE — PROGRESS NOTES
Verbal shift change report given to [employer] (oncoming nurse) by Wendie Felix  (offgoing nurse). Report included the following information Kardex, ED Summary, Procedure Summary, MAR and Recent Results.

## 2021-12-29 NOTE — PROGRESS NOTES
Problem: Risk for Spread of Infection  Goal: Prevent transmission of infectious organism to others  Description: Prevent the transmission of infectious organisms to other patients, staff members, and visitors. Outcome: Progressing Towards Goal     Problem: Patient Education:  Go to Education Activity  Goal: Patient/Family Education  Outcome: Progressing Towards Goal     Problem: Falls - Risk of  Goal: *Absence of Falls  Description: Document Brendan Barrios Fall Risk and appropriate interventions in the flowsheet. Outcome: Progressing Towards Goal  Note: Fall Risk Interventions:            Medication Interventions: Teach patient to arise slowly                   Problem: Patient Education: Go to Patient Education Activity  Goal: Patient/Family Education  Outcome: Progressing Towards Goal     Problem: Airway Clearance - Ineffective  Goal: Achieve or maintain patent airway  Outcome: Progressing Towards Goal     Problem: Gas Exchange - Impaired  Goal: Absence of hypoxia  Outcome: Progressing Towards Goal  Goal: Promote optimal lung function  Outcome: Progressing Towards Goal     Problem: Breathing Pattern - Ineffective  Goal: Ability to achieve and maintain a regular respiratory rate  Outcome: Progressing Towards Goal     Problem:  Body Temperature -  Risk of, Imbalanced  Goal: Ability to maintain a body temperature within defined limits  Outcome: Progressing Towards Goal  Goal: Will regain or maintain usual level of consciousness  Outcome: Progressing Towards Goal  Goal: Complications related to the disease process, condition or treatment will be avoided or minimized  Outcome: Progressing Towards Goal     Problem: Isolation Precautions - Risk of Spread of Infection  Goal: Prevent transmission of infectious organism to others  Outcome: Progressing Towards Goal     Problem: Nutrition Deficits  Goal: Optimize nutrtional status  Outcome: Progressing Towards Goal     Problem: Risk for Fluid Volume Deficit  Goal: Maintain normal heart rhythm  Outcome: Progressing Towards Goal  Goal: Maintain absence of muscle cramping  Outcome: Progressing Towards Goal  Goal: Maintain normal serum potassium, sodium, calcium, phosphorus, and pH  Outcome: Progressing Towards Goal     Problem: Loneliness or Risk for Loneliness  Goal: Demonstrate positive use of time alone when socialization is not possible  Outcome: Progressing Towards Goal     Problem: Fatigue  Goal: Verbalize increase energy and improved vitality  Outcome: Progressing Towards Goal     Problem: Patient Education: Go to Patient Education Activity  Goal: Patient/Family Education  Outcome: Progressing Towards Goal

## 2021-12-29 NOTE — PROGRESS NOTES
Problem: Risk for Spread of Infection  Goal: Prevent transmission of infectious organism to others  Description: Prevent the transmission of infectious organisms to other patients, staff members, and visitors. Outcome: Progressing Towards Goal     Problem: Falls - Risk of  Goal: *Absence of Falls  Description: Document Lidia Pedro Fall Risk and appropriate interventions in the flowsheet.   Outcome: Progressing Towards Goal  Note: Fall Risk Interventions:            Medication Interventions: Teach patient to arise slowly                   Problem: Airway Clearance - Ineffective  Goal: Achieve or maintain patent airway  Outcome: Progressing Towards Goal

## 2021-12-30 VITALS
OXYGEN SATURATION: 94 % | TEMPERATURE: 98.3 F | SYSTOLIC BLOOD PRESSURE: 106 MMHG | WEIGHT: 267.86 LBS | HEIGHT: 73 IN | BODY MASS INDEX: 35.5 KG/M2 | RESPIRATION RATE: 22 BRPM | DIASTOLIC BLOOD PRESSURE: 75 MMHG | HEART RATE: 65 BPM

## 2021-12-30 LAB
ANION GAP SERPL CALC-SCNC: 4 MMOL/L (ref 5–15)
BASOPHILS # BLD: 0 K/UL (ref 0–0.1)
BASOPHILS NFR BLD: 0 % (ref 0–1)
BUN SERPL-MCNC: 18 MG/DL (ref 6–20)
BUN/CREAT SERPL: 22 (ref 12–20)
CALCIUM SERPL-MCNC: 9.1 MG/DL (ref 8.5–10.1)
CHLORIDE SERPL-SCNC: 102 MMOL/L (ref 97–108)
CO2 SERPL-SCNC: 30 MMOL/L (ref 21–32)
CREAT SERPL-MCNC: 0.81 MG/DL (ref 0.7–1.3)
CRP SERPL-MCNC: <0.29 MG/DL (ref 0–0.6)
DIFFERENTIAL METHOD BLD: ABNORMAL
EOSINOPHIL # BLD: 0 K/UL (ref 0–0.4)
EOSINOPHIL NFR BLD: 0 % (ref 0–7)
ERYTHROCYTE [DISTWIDTH] IN BLOOD BY AUTOMATED COUNT: 13 % (ref 11.5–14.5)
FERRITIN SERPL-MCNC: 385 NG/ML (ref 26–388)
GLUCOSE SERPL-MCNC: 159 MG/DL (ref 65–100)
HCT VFR BLD AUTO: 47.5 % (ref 36.6–50.3)
HGB BLD-MCNC: 15.9 G/DL (ref 12.1–17)
IMM GRANULOCYTES # BLD AUTO: 0 K/UL
IMM GRANULOCYTES NFR BLD AUTO: 0 %
LDH SERPL L TO P-CCNC: 230 U/L (ref 85–241)
LYMPHOCYTES # BLD: 1.1 K/UL (ref 0.8–3.5)
LYMPHOCYTES NFR BLD: 31 % (ref 12–49)
MCH RBC QN AUTO: 29.8 PG (ref 26–34)
MCHC RBC AUTO-ENTMCNC: 33.5 G/DL (ref 30–36.5)
MCV RBC AUTO: 89.1 FL (ref 80–99)
MONOCYTES # BLD: 0.4 K/UL (ref 0–1)
MONOCYTES NFR BLD: 11 % (ref 5–13)
NEUTS SEG # BLD: 2 K/UL (ref 1.8–8)
NEUTS SEG NFR BLD: 58 % (ref 32–75)
NRBC # BLD: 0 K/UL (ref 0–0.01)
NRBC BLD-RTO: 0 PER 100 WBC
PLATELET # BLD AUTO: 242 K/UL (ref 150–400)
PMV BLD AUTO: 10.6 FL (ref 8.9–12.9)
POTASSIUM SERPL-SCNC: 4.2 MMOL/L (ref 3.5–5.1)
RBC # BLD AUTO: 5.33 M/UL (ref 4.1–5.7)
RBC MORPH BLD: ABNORMAL
SODIUM SERPL-SCNC: 136 MMOL/L (ref 136–145)
WBC # BLD AUTO: 3.5 K/UL (ref 4.1–11.1)

## 2021-12-30 PROCEDURE — 74011250637 HC RX REV CODE- 250/637: Performed by: INTERNAL MEDICINE

## 2021-12-30 PROCEDURE — 94640 AIRWAY INHALATION TREATMENT: CPT

## 2021-12-30 PROCEDURE — 83615 LACTATE (LD) (LDH) ENZYME: CPT

## 2021-12-30 PROCEDURE — 85025 COMPLETE CBC W/AUTO DIFF WBC: CPT

## 2021-12-30 PROCEDURE — 74011250636 HC RX REV CODE- 250/636: Performed by: INTERNAL MEDICINE

## 2021-12-30 PROCEDURE — 82728 ASSAY OF FERRITIN: CPT

## 2021-12-30 PROCEDURE — 36415 COLL VENOUS BLD VENIPUNCTURE: CPT

## 2021-12-30 PROCEDURE — 86140 C-REACTIVE PROTEIN: CPT

## 2021-12-30 PROCEDURE — 80048 BASIC METABOLIC PNL TOTAL CA: CPT

## 2021-12-30 RX ORDER — DEXAMETHASONE 6 MG/1
6 TABLET ORAL
Qty: 7 TABLET | Refills: 0 | Status: SHIPPED | OUTPATIENT
Start: 2021-12-30 | End: 2022-01-06

## 2021-12-30 RX ORDER — IBUPROFEN 200 MG
1 TABLET ORAL EVERY 24 HOURS
Qty: 7 PATCH | Refills: 0 | Status: SHIPPED | OUTPATIENT
Start: 2021-12-30 | End: 2022-01-06

## 2021-12-30 RX ORDER — METOPROLOL SUCCINATE 100 MG/1
100 TABLET, EXTENDED RELEASE ORAL DAILY
Qty: 30 TABLET | Refills: 0 | Status: SHIPPED | OUTPATIENT
Start: 2021-12-30 | End: 2022-01-29

## 2021-12-30 RX ORDER — ASCORBIC ACID 500 MG
500 TABLET ORAL 2 TIMES DAILY
Qty: 14 TABLET | Refills: 0 | Status: SHIPPED | OUTPATIENT
Start: 2021-12-30 | End: 2022-01-06

## 2021-12-30 RX ORDER — ZINC SULFATE 50(220)MG
1 CAPSULE ORAL DAILY
Qty: 7 CAPSULE | Refills: 0 | Status: SHIPPED | OUTPATIENT
Start: 2021-12-31 | End: 2022-01-07

## 2021-12-30 RX ORDER — PHENOL/SODIUM PHENOLATE
20 AEROSOL, SPRAY (ML) MUCOUS MEMBRANE DAILY
Qty: 30 TABLET | Refills: 0 | Status: SHIPPED | OUTPATIENT
Start: 2021-12-30 | End: 2022-01-29

## 2021-12-30 RX ORDER — IBUPROFEN 200 MG
1 TABLET ORAL DAILY
Qty: 7 PATCH | Refills: 0 | Status: SHIPPED | OUTPATIENT
Start: 2021-12-31 | End: 2022-01-07

## 2021-12-30 RX ORDER — NICOTINE 7MG/24HR
1 PATCH, TRANSDERMAL 24 HOURS TRANSDERMAL EVERY 24 HOURS
Qty: 7 PATCH | Refills: 0 | Status: SHIPPED | OUTPATIENT
Start: 2021-12-30 | End: 2022-01-06

## 2021-12-30 RX ORDER — SPIRONOLACTONE 25 MG/1
25 TABLET ORAL DAILY
Qty: 30 TABLET | Refills: 0 | Status: SHIPPED | OUTPATIENT
Start: 2021-12-30

## 2021-12-30 RX ORDER — ROSUVASTATIN CALCIUM 20 MG/1
20 TABLET, COATED ORAL
Qty: 30 TABLET | Refills: 0 | Status: SHIPPED | OUTPATIENT
Start: 2021-12-30

## 2021-12-30 RX ADMIN — IPRATROPIUM BROMIDE AND ALBUTEROL 1 PUFF: 20; 100 SPRAY, METERED RESPIRATORY (INHALATION) at 13:48

## 2021-12-30 RX ADMIN — BENZONATATE 100 MG: 100 CAPSULE ORAL at 09:46

## 2021-12-30 RX ADMIN — PANTOPRAZOLE SODIUM 40 MG: 40 TABLET, DELAYED RELEASE ORAL at 06:02

## 2021-12-30 RX ADMIN — METOPROLOL SUCCINATE 100 MG: 50 TABLET, EXTENDED RELEASE ORAL at 09:46

## 2021-12-30 RX ADMIN — DEXAMETHASONE 6 MG: 6 TABLET ORAL at 09:47

## 2021-12-30 RX ADMIN — ATORVASTATIN CALCIUM 20 MG: 20 TABLET, FILM COATED ORAL at 09:47

## 2021-12-30 RX ADMIN — BARICITINIB 4 MG: 2 TABLET, FILM COATED ORAL at 09:46

## 2021-12-30 RX ADMIN — ASPIRIN 81 MG: 81 TABLET, COATED ORAL at 09:46

## 2021-12-30 RX ADMIN — OXYCODONE HYDROCHLORIDE AND ACETAMINOPHEN 500 MG: 500 TABLET ORAL at 09:46

## 2021-12-30 RX ADMIN — IPRATROPIUM BROMIDE AND ALBUTEROL 1 PUFF: 20; 100 SPRAY, METERED RESPIRATORY (INHALATION) at 07:53

## 2021-12-30 RX ADMIN — IPRATROPIUM BROMIDE AND ALBUTEROL 1 PUFF: 20; 100 SPRAY, METERED RESPIRATORY (INHALATION) at 01:46

## 2021-12-30 RX ADMIN — ZINC SULFATE 220 MG (50 MG) CAPSULE 1 CAPSULE: CAPSULE at 09:46

## 2021-12-30 RX ADMIN — SPIRONOLACTONE 25 MG: 25 TABLET ORAL at 09:46

## 2021-12-30 NOTE — PROGRESS NOTES
12/30/21 1349   Resting (Room Air)   SpO2 94   HR 70   Resting (On O2)   SpO2 96   HR 72   O2 Device Nasal cannula   O2 Flow Rate (l/min) 2 l/min   FIO2 (%) 28   During Walk (Room Air)   SpO2 92   HR 80   Walk/Assistance Device Ambulation   Rate of Dyspnea 0   Comments   (pt states he feels good)   During Walk (On O2)   SpO2 96   HR 88   O2 Device Nasal cannula   O2 Flow Rate (l/min) 2 l/min   Need Additional O2 Flow Rate Rows No   Walk/Assistance Device Ambulation   Rate of Dyspnea 0   Comments   (pt is without sob, states he feels fine)   After Walk   SpO2 94   HR 84   O2 Device   (room air)   FIO2 (%) 21   Rate of Dyspnea 0   Symptoms Other (comment)   Comments cough   Does the Patient Qualify for Home O2 No   Does the Patient Need Portable Oxygen Tanks No

## 2021-12-30 NOTE — PROGRESS NOTES
Problem: Risk for Spread of Infection  Goal: Prevent transmission of infectious organism to others  Description: Prevent the transmission of infectious organisms to other patients, staff members, and visitors. Outcome: Progressing Towards Goal     Problem: Patient Education:  Go to Education Activity  Goal: Patient/Family Education  Outcome: Progressing Towards Goal     Problem: Falls - Risk of  Goal: *Absence of Falls  Description: Document Tho Blight Fall Risk and appropriate interventions in the flowsheet. Outcome: Progressing Towards Goal  Note: Fall Risk Interventions:            Medication Interventions: Teach patient to arise slowly,Patient to call before getting OOB                   Problem: Patient Education: Go to Patient Education Activity  Goal: Patient/Family Education  Outcome: Progressing Towards Goal     Problem:  Body Temperature -  Risk of, Imbalanced  Goal: Ability to maintain a body temperature within defined limits  Outcome: Progressing Towards Goal  Goal: Will regain or maintain usual level of consciousness  Outcome: Progressing Towards Goal  Goal: Complications related to the disease process, condition or treatment will be avoided or minimized  Outcome: Progressing Towards Goal     Problem: Nutrition Deficits  Goal: Optimize nutrtional status  Outcome: Progressing Towards Goal     Problem: Isolation Precautions - Risk of Spread of Infection  Goal: Prevent transmission of infectious organism to others  Outcome: Progressing Towards Goal     Problem: Risk for Fluid Volume Deficit  Goal: Maintain normal heart rhythm  Outcome: Progressing Towards Goal  Goal: Maintain absence of muscle cramping  Outcome: Progressing Towards Goal  Goal: Maintain normal serum potassium, sodium, calcium, phosphorus, and pH  Outcome: Progressing Towards Goal     Problem: Loneliness or Risk for Loneliness  Goal: Demonstrate positive use of time alone when socialization is not possible  Outcome: Progressing Towards Goal Problem: Fatigue  Goal: Verbalize increase energy and improved vitality  Outcome: Progressing Towards Goal     Problem: Patient Education: Go to Patient Education Activity  Goal: Patient/Family Education  Outcome: Progressing Towards Goal

## 2021-12-30 NOTE — PROGRESS NOTES
Patient 98% on RA at rest. Upon standing, patient O2 dropped to 80% on RA. TW instructed patient to sit, patient in NAD. Patient recovered to 100% on 2L NC at rest. Upon ambulation to chair, patient O2 dropped to 80% on 2L.  Patient returned to sitting position and recovered to 96% on 2L NC.

## 2021-12-30 NOTE — DISCHARGE INSTRUCTIONS
HOSPITALIST DISCHARGE INSTRUCTIONS  NAME: Addis Medrano   :  1970   MRN:  [id number]     Date/Time:  2021 1:13 PM    ADMIT DATE: 2021     DISCHARGE DATE: 2021     ADMITTING DIAGNOSIS:  CORONAVIRUS   Hypoxia     DISCHARGE DIAGNOSIS:  As above     MEDICATIONS:     · It is important that you take the medication exactly as they are prescribed. · Keep your medication in the bottles provided by the pharmacist and keep a list of the medication names, dosages, and times to be taken in your wallet. · Do not take other medications without consulting your doctor. Pain Management: per above medications    What to do at Home    Recommended diet:  Cardiac Diet    Recommended activity: Activity as tolerated    If you experience any of the following symptoms then please call your primary care physician or return to the emergency room if you cannot get hold of your doctor:  Fever, chills, nausea, vomiting, diarrhea, change in mentation, falling, bleeding, shortness of breath, chest pain     Follow Up:  Dr. Aurora Pryor MD  you are to call and set up an appointment to see them in 1-2 weeks. Follow-up Information     Follow up With Specialties Details Why Contact Info    Aurora Pryor MD Family Medicine   [address] [place] [id number] [phone]            Information obtained by :  I understand that if any problems occur once I am at home I am to contact my physician. I understand and acknowledge receipt of the instructions indicated above.                                                                                                                                            Physician's or R.N.'s Signature                                                                  Date/Time                                                                                                                                              Patient or Representative Signature Date/Time          [address] December 30, 2021       RE: Nick Cantu      To Whom It May Concern,    This is to certify that [address] [place] [address] may return to work as late as 1/11/2022     Please feel free to contact my office if you have any questions or concerns. Thank you for your assistance in this matter.       Sincerely,  Calos Parks MD  [phone]

## 2021-12-30 NOTE — PROGRESS NOTES
12/30/21  2:08 PM    CM noted dc order. Patient does not qualify for home O2. No CM other dc needs noted. Care Management Interventions  PCP Verified by CM:  Yes Winferroxana Rene )  Haley Signup: No  Discharge Durable Medical Equipment: No  Physical Therapy Consult: No  Occupational Therapy Consult: No  Speech Therapy Consult: No  Support Systems: Spouse/Significant Other,Other Family Member(s)  Discharge Location  Discharge Placement: Home with family assistance

## 2021-12-30 NOTE — PROGRESS NOTES
Pulse oximetry assessment   96% at rest on room air (if 88% or less, skip next steps)  89% while ambulating on room air and recovered to 96% on RA

## 2022-01-03 ENCOUNTER — PATIENT OUTREACH (OUTPATIENT)
Dept: CASE MANAGEMENT | Age: 52
End: 2022-01-03

## 2022-01-03 NOTE — DISCHARGE SUMMARY
Physician Discharge Summary     Patient ID:  Daisy Grissom  [id number]  74 y.o.  1970    Admit date: 12/27/2021    Discharge date and time: 12/30/2021    Admission Diagnoses: COVID-19 virus infection [U07.1]    Discharge Diagnoses/Hospital Course   Mr. Deonna Dumont is a 46 yo male with PMH of HTN, tobacco abuse admitted for hypoxia 2/2 COVID. He is unvaccinated. He was started on steroids, baricitinib, vitamin C/zinc, Combivent with improvement. His d-dimer and CRP were normal at the time of discharge. He did NOT require O2 at discharge. He will need close follow-up with PCP after discharge. He requested nicotine patches at discharge to assist with tobacco cessation.      PCP: Abiola Jackson MD     Consults: None    Discharge Exam:  Visit Vitals  /75 (BP 1 Location: Left upper arm, BP Patient Position: Sitting)   Pulse 65   Temp 98.3 °F (36.8 °C)   Resp 22   Ht 6' 1\" (1.854 m)   Wt 121.5 kg (267 lb 13.7 oz)   SpO2 94%   BMI 35.34 kg/m²        Gen:  Well-developed, well-nourished, in no acute distress  HEENT:  Pink conjunctivae, PERRL, hearing intact to voice, moist mucous membranes  Neck:  Supple, without masses, thyroid non-tender  Resp:  No accessory muscle use, clear breath sounds without wheezes rales or rhonchi  Card:  No murmurs, normal S1, S2 without thrills, bruits or peripheral edema  Abd:  Soft, non-tender, non-distended, normoactive bowel sounds are present, no palpable organomegaly and no detectable hernias  Lymph:  No cervical or inguinal adenopathy  Musc:  No cyanosis or clubbing  Skin:  No rashes or ulcers, skin turgor is good  Neuro:  Cranial nerves are grossly intact, no focal motor weakness, follows commands appropriately  Psych:  Good insight, oriented to person, place and time, alert    Disposition: home    Patient Instructions:   Discharge Medication List as of 12/30/2021  3:03 PM      START taking these medications    Details   ascorbic acid, vitamin C, (VITAMIN C) 500 mg tablet Take 1 Tablet by mouth two (2) times a day for 7 days. , Normal, Disp-14 Tablet, R-0      dexAMETHasone (DECADRON) 6 mg tablet Take 1 Tablet by mouth Daily (before breakfast) for 7 days. , Normal, Disp-7 Tablet, R-0      ipratropium-albuteroL (COMBIVENT RESPIMAT)  mcg/actuation inhaler Take 1 Puff by inhalation every six (6) hours as needed for Wheezing., Normal, Disp-4 g, R-0      zinc sulfate (ZINCATE) 50 mg zinc (220 mg) capsule Take 1 Capsule by mouth daily for 7 days. , Normal, Disp-7 Capsule, R-0      nicotine (NICODERM CQ) 21 mg/24 hr 1 Patch by TransDERmal route daily for 7 days. , Normal, Disp-7 Patch, R-0      nicotine (NICODERM CQ) 14 mg/24 hr patch 1 Patch by TransDERmal route every twenty-four (24) hours for 7 days. , Normal, Disp-7 Patch, R-0      nicotine (NICODERM CQ) 7 mg/24 hr 1 Patch by TransDERmal route every twenty-four (24) hours for 7 days. , Normal, Disp-7 Patch, R-0      rosuvastatin (CRESTOR) 20 mg tablet Take 1 Tablet by mouth nightly., Normal, Disp-30 Tablet, R-0      Omeprazole delayed release (PRILOSEC D/R) 20 mg tablet Take 1 Tablet by mouth daily for 30 days. , Normal, Disp-30 Tablet, R-0         CONTINUE these medications which have CHANGED    Details   spironolactone (ALDACTONE) 25 mg tablet Take 1 Tablet by mouth daily. , Normal, Disp-30 Tablet, R-0      metoprolol succinate (TOPROL-XL) 100 mg tablet Take 1 Tablet by mouth daily for 30 days. , Normal, Disp-30 Tablet, R-0         CONTINUE these medications which have NOT CHANGED    Details   lisinopril (PRINIVIL, ZESTRIL) 5 mg tablet Take 5 mg by mouth daily. Historical Med, 5 mg      aspirin 81 mg tablet Take 81 mg by mouth. Historical Med, 81 mg      chlorpheniramine-HYDROcodone (TUSSIONEX PENNKINETIC ER) 8-10 mg/5 mL suspension Take 5 mL by mouth three (3) times daily. Print, 5 mL, Disp-100 mL, R-0      albuterol (PROVENTIL HFA, VENTOLIN HFA) 90 mcg/Actuation inhaler Take 2 Puffs by inhalation every four (4) hours as needed for Wheezing and Shortness of Breath (cough). Print, 2 Puff, Disp-1 Inhaler, R-1         STOP taking these medications       simvastatin (ZOCOR) 40 mg tablet Comments:   Reason for Stopping:         lansoprazole (PREVACID) 30 mg capsule Comments:   Reason for Stopping:             Activity: Activity as tolerated  Diet: Resume previous diet  Wound Care: None needed    Follow-up with Irena Tamayo MD I  Follow-up Information     Follow up With Specialties Details Why Contact Info    Irena Tamayo MD Family Medicine Go on 1/3/2022 Hospital Follow Up Telehealth Virtual Visit at 1:15pm [address] [phone]      [facility]  Urgent Care in the home [address] [facility]  [address] [place] [address] [phone]          Approximate time spent in patient care on day of discharge: 35 minutes     Signed:  Surekha Kent MD  1/2/2022  9:03 PM

## 2022-01-03 NOTE — PROGRESS NOTES
Patient contacted regarding COVID-19 diagnosis. Discussed COVID-19 related testing which was available at this time. Test results were positive. Patient informed of results, if available? yes. Care Transition Nurse contacted the patient by telephone to perform post discharge assessment. Call within 2 business days of discharge: Yes Verified name and  with patient as identifiers. Provided introduction to self, and explanation of the CTN/ACM role, and reason for call due to risk factors for infection and/or exposure to COVID-19. Symptoms reviewed with patient who verbalized the following symptoms: fatigue, cough, loss or taste or smell, no new symptoms and no worsening symptoms      Due to no new or worsening symptoms encounter was not routed to provider for escalation. Discussed follow-up appointments. If no appointment was previously scheduled, appointment scheduling offered:  . [facility] follow up appointment(s): No future appointments. Non-[facility] follow up appointment(s): PCP 1/3/22    Interventions to address risk factors: Scheduled appointment with PCP-1/3/22 and Obtained and reviewed discharge summary and/or continuity of care documents     Advance Care Planning:   Does patient have an Advance Directive: not on file education deferred to future outreach    Educated patient about risk for severe COVID-19 due to risk factors according to CDC guidelines. CTN reviewed discharge instructions, medical action plan and red flag symptoms with the patient who verbalized understanding. Discussed COVID vaccination status: no. Education provided on COVID-19 vaccination as appropriate. Discussed exposure protocols and quarantine with CDC Guidelines. Patient was given an opportunity to verbalize any questions and concerns and agrees to contact CTN or health care provider for questions related to their healthcare.     Reviewed and educated patient on any new and changed medications related to discharge diagnosis     Was patient discharged with a pulse oximeter? no Discussed and confirmed pulse oximeter discharge instructions and when to notify provider or seek emergency care. CTN provided contact information. Plan for follow-up call in 5-7 days based on severity of symptoms and risk factors.

## 2022-01-21 ENCOUNTER — PATIENT OUTREACH (OUTPATIENT)
Dept: CASE MANAGEMENT | Age: 52
End: 2022-01-21

## 2022-01-21 NOTE — PROGRESS NOTES
Follow Up Call    Encounter was not routed to provider for escalation. Method of communication with provider: none. Contacted the patient by telephone to follow up after hospital visit. Status: [facility] follow up appointment(s): No future appointments. Follow up appointment completed? yes. Provided contact information for future needs. No further follow-up call indicated based on severity of symptoms and risk factors.       Jw Welch RN

## 2022-03-18 PROBLEM — R09.02 HYPOXIA: Status: ACTIVE | Noted: 2021-12-27

## 2022-03-18 PROBLEM — I10 HTN (HYPERTENSION), BENIGN: Status: ACTIVE | Noted: 2021-12-27

## 2022-03-19 PROBLEM — Z72.0 TOBACCO ABUSE: Status: ACTIVE | Noted: 2021-12-27

## 2022-03-19 PROBLEM — I42.9 CARDIOMYOPATHY (HCC): Status: ACTIVE | Noted: 2021-12-27

## 2022-03-19 PROBLEM — J96.01 ACUTE RESPIRATORY FAILURE WITH HYPOXIA (HCC): Status: ACTIVE | Noted: 2021-12-28

## 2022-03-19 PROBLEM — J12.82 PNEUMONIA DUE TO COVID-19 VIRUS: Status: ACTIVE | Noted: 2021-12-27

## 2022-03-19 PROBLEM — U07.1 PNEUMONIA DUE TO COVID-19 VIRUS: Status: ACTIVE | Noted: 2021-12-27

## 2023-05-10 RX ORDER — ROSUVASTATIN CALCIUM 20 MG/1
1 TABLET, COATED ORAL NIGHTLY
COMMUNITY
Start: 2021-12-30

## 2023-05-10 RX ORDER — HYDROCODONE POLISTIREX AND CHLORPHENIRAMINE POLISTIREX 10; 8 MG/5ML; MG/5ML
SUSPENSION, EXTENDED RELEASE ORAL 3 TIMES DAILY
COMMUNITY
Start: 2011-12-03

## 2023-05-10 RX ORDER — ALBUTEROL SULFATE 90 UG/1
2 AEROSOL, METERED RESPIRATORY (INHALATION) EVERY 4 HOURS PRN
COMMUNITY
Start: 2011-12-03

## 2023-05-10 RX ORDER — LISINOPRIL 5 MG/1
5 TABLET ORAL DAILY
COMMUNITY

## 2023-05-10 RX ORDER — SPIRONOLACTONE 25 MG/1
1 TABLET ORAL DAILY
COMMUNITY
Start: 2021-12-30